# Patient Record
Sex: MALE | Race: WHITE | Employment: FULL TIME | URBAN - METROPOLITAN AREA
[De-identification: names, ages, dates, MRNs, and addresses within clinical notes are randomized per-mention and may not be internally consistent; named-entity substitution may affect disease eponyms.]

---

## 2022-03-19 PROBLEM — Z23 ENCOUNTER FOR IMMUNIZATION: Status: ACTIVE | Noted: 2019-02-15

## 2024-10-09 ENCOUNTER — OFFICE VISIT (OUTPATIENT)
Age: 49
End: 2024-10-09

## 2024-10-09 VITALS — HEART RATE: 80 BPM | RESPIRATION RATE: 16 BRPM | OXYGEN SATURATION: 98 % | TEMPERATURE: 98.7 F

## 2024-10-09 DIAGNOSIS — H66.90 ACUTE OTITIS MEDIA, UNSPECIFIED OTITIS MEDIA TYPE: Primary | ICD-10-CM

## 2024-10-09 DIAGNOSIS — Z86.16 HISTORY OF COVID-19: ICD-10-CM

## 2024-10-09 RX ORDER — MULTIVITAMIN
1 TABLET ORAL DAILY
COMMUNITY

## 2024-10-09 RX ORDER — AZITHROMYCIN 250 MG/1
TABLET, FILM COATED ORAL
Qty: 6 TABLET | Refills: 0 | Status: SHIPPED | OUTPATIENT
Start: 2024-10-09

## 2024-10-09 RX ORDER — CETIRIZINE HYDROCHLORIDE 10 MG/1
10 TABLET ORAL DAILY
COMMUNITY

## 2024-10-09 ASSESSMENT — ENCOUNTER SYMPTOMS
EYES NEGATIVE: 1
COUGH: 1
RHINORRHEA: 0
SINUS PAIN: 0
SINUS PRESSURE: 0
SORE THROAT: 0
DIARRHEA: 0
ABDOMINAL PAIN: 0
SHORTNESS OF BREATH: 0
VOMITING: 0

## 2024-10-09 NOTE — PROGRESS NOTES
adenopathy.   Skin:     General: Skin is warm and dry.   Neurological:      General: No focal deficit present.      Mental Status: He is alert and oriented to person, place, and time.   Psychiatric:         Mood and Affect: Mood normal.         Behavior: Behavior normal.       ASSESSMENT and PLAN    Gordo was seen today for ear fullness and otalgia.    Diagnoses and all orders for this visit:    Acute otitis media, unspecified otitis media type  -     azithromycin (ZITHROMAX) 250 MG tablet; Take 500mg on day 1 followed by 250mg on days 2 through 5    History of COVID-19    Recommend trying OTC Sudafed per packaging instructions for 3-5 days for middle ear effusion. Continue Zyrtec for allergy prevention and maintenance. Seek medical care if symptoms worsen or fail to improve with treatment. Follow up with PCP for routine physical.    Counseled on benefits of having a primary care provider which includes, but is not limited to, continuity of care and having a medical home when concerns arise. Also enforced that onsite clinic policy states that we are not to take the place of a primary care provider, pt verbalized understanding.     SEs and risk vs benefits associated with medications prescribed discussed with patient who verbalized understanding. Pt verbalized understanding and agreement with plan of care. RTC for persisting/worsening symptoms or new complaints that arise. Discussed signs and symptoms that would warrant immediate evaluation including, but not limited to HA, blurred vision, speech disturbance, difficulty with ambulation/gait, numbness, tingling, weakness, syncope, chest pain, or shortness of breath.    I have reviewed the patient's medication list, past medical, family, social, and surgical history in detail and updated the patient record appropriately.    Staci Wilson, MSN, APRN, FNP-BC  Family Nurse Practitioner  [facility]   [email or web address]   Voicemail:

## 2024-10-16 ENCOUNTER — OFFICE VISIT (OUTPATIENT)
Age: 49
End: 2024-10-16

## 2024-10-16 DIAGNOSIS — H65.92 MIDDLE EAR EFFUSION, LEFT: Primary | ICD-10-CM

## 2024-10-16 ASSESSMENT — ENCOUNTER SYMPTOMS: COUGH: 0

## 2024-10-16 NOTE — PROGRESS NOTES
PROGRESS NOTE    SUBJECTIVE:   Gordo Shi is a 49 y.o. male seen in the [facility] at [facility] for ear pressure. The patient was seen one week ago in the clinic for ear complaint. Was placed on azithromycin, sudafed, flonase. Did complete the course of antibiotics. The patient notes that he still has tinnitus, ear pressure, and a feeling of \"water in the left ear\".  Other symptoms from last visit resolved.     Chief Complaint    Ear Fullness         HPI    Current Outpatient Medications   Medication Sig Dispense Refill    FIBER PO Take 2 tablets by mouth 3 times daily      cetirizine (ZYRTEC) 10 MG tablet Take 1 tablet by mouth daily      Multiple Vitamin (MULTI VITAMIN DAILY) TABS Take 1 tablet by mouth daily      azithromycin (ZITHROMAX) 250 MG tablet Take 500mg on day 1 followed by 250mg on days 2 through 5 6 tablet 0     No current facility-administered medications for this visit.      Allergies   Allergen Reactions    Prednisone Rash       Social History     Tobacco Use    Smoking status: Unknown   Substance Use Topics    Alcohol use: Yes    Drug use: No        Review of Systems   Constitutional: Negative.  Negative for fatigue.   HENT:  Positive for ear pain. Negative for congestion.    Respiratory:  Negative for cough.    Neurological:  Negative for dizziness.   Psychiatric/Behavioral: Negative.            OBJECTIVE:  There were no vitals taken for this visit. - deferred.     No results found for this visit on 10/16/24.    Physical Exam  Constitutional:       Appearance: Normal appearance.   HENT:      Head: Normocephalic.      Right Ear: Hearing, tympanic membrane, ear canal and external ear normal.      Left Ear: Hearing and external ear normal. No tenderness. A middle ear effusion is present.      Nose: Nose normal. No congestion.   Pulmonary:      Effort: Pulmonary effort is normal.      Breath sounds: Normal breath sounds.   Skin:     General: Skin is warm and dry.

## 2024-10-23 ENCOUNTER — OFFICE VISIT (OUTPATIENT)
Age: 49
End: 2024-10-23

## 2024-10-23 DIAGNOSIS — H91.92 DECREASED HEARING OF LEFT EAR: ICD-10-CM

## 2024-10-23 DIAGNOSIS — H93.12 NEW ONSET TINNITUS OF LEFT EAR: Primary | ICD-10-CM

## 2024-10-23 DIAGNOSIS — H92.02 LEFT EAR PAIN: ICD-10-CM

## 2024-10-23 ASSESSMENT — ENCOUNTER SYMPTOMS
VOICE CHANGE: 0
RHINORRHEA: 0
SORE THROAT: 0
SINUS PRESSURE: 0
EYE ITCHING: 0
FACIAL SWELLING: 0
EYE REDNESS: 0
TROUBLE SWALLOWING: 0
EYE PAIN: 0
SINUS PAIN: 0
SHORTNESS OF BREATH: 0
EYE DISCHARGE: 0

## 2024-10-23 NOTE — PROGRESS NOTES
[facility] [employer]   [facility]- Onsite Clinic        Date of Visit: 10/23/24       SUBJECTIVE:   Gordo Shi is a 49 y.o. male presenting to the onsite Wellness [facility] at his place of employment, [employer], for evaluation of the following chief complaint(s):    Chief Complaint    Tinnitus         HPI  Gordo has c/o left ear pain, fullness, tinnitus, and hearing loss for approximately 4 weeks now. He was treated for Covid on 9/20, subsequently developed these symptoms about 1 week following, and was then treated for an ear infection on 10/9 with azithromycin and was also recommended to take sudafed, zyrtec, and Flonase daily to assist with drainage. He followed-up in office last week and the ear infection had resolved, however he continued to report pain, fullness, reduced hearing, and tinnitus in the ear. Advised to continue with daily sudafed, zyrtec, and Flonase and educated that best results are seen with persistent use.      Today he reports continued sensation of fullness and reduction in hearing, slight improvement in pain, but worsening tinnitus. He denies headaches, dizziness, vertigo, visual disturbances, facial pain or numbness. Denies history of head trauma, TMJ dysfunction, or significant noise exposure. States he has also been very careful about wearing ear protection at work, when using loud tools, for concerts, etc. Reviewed current and previous medications and supplements for potential ototoxins. At one point took lisinopril, but was discontinued over 6 years ago.    He describes the tinnitus as a constant high pitched noise. Denies a pulsing, rushing, humming, or clicking character to the sound. No change in pitch or intensity with head motion, body position, or exercise.        Current Outpatient Medications   Medication Sig Dispense Refill    FIBER PO Take 2 tablets by mouth 3 times daily      cetirizine (ZYRTEC) 10 MG tablet Take 1 tablet by mouth daily      Multiple Vitamin